# Patient Record
Sex: MALE | Race: BLACK OR AFRICAN AMERICAN | NOT HISPANIC OR LATINO | Employment: FULL TIME | URBAN - METROPOLITAN AREA
[De-identification: names, ages, dates, MRNs, and addresses within clinical notes are randomized per-mention and may not be internally consistent; named-entity substitution may affect disease eponyms.]

---

## 2021-04-15 ENCOUNTER — OFFICE VISIT (OUTPATIENT)
Dept: PRIMARY CARE CLINIC | Facility: CLINIC | Age: 41
End: 2021-04-15
Payer: COMMERCIAL

## 2021-04-15 VITALS
HEART RATE: 89 BPM | WEIGHT: 315 LBS | HEIGHT: 73 IN | RESPIRATION RATE: 16 BRPM | BODY MASS INDEX: 41.75 KG/M2 | OXYGEN SATURATION: 99 % | DIASTOLIC BLOOD PRESSURE: 80 MMHG | SYSTOLIC BLOOD PRESSURE: 128 MMHG

## 2021-04-15 DIAGNOSIS — M79.672 CHRONIC HEEL PAIN, LEFT: ICD-10-CM

## 2021-04-15 DIAGNOSIS — J34.89 NASAL DRYNESS: ICD-10-CM

## 2021-04-15 DIAGNOSIS — G89.29 CHRONIC HEEL PAIN, LEFT: ICD-10-CM

## 2021-04-15 DIAGNOSIS — Z76.89 ENCOUNTER TO ESTABLISH CARE: Primary | ICD-10-CM

## 2021-04-15 DIAGNOSIS — Z11.59 NEED FOR HEPATITIS C SCREENING TEST: ICD-10-CM

## 2021-04-15 DIAGNOSIS — Z11.4 ENCOUNTER FOR SCREENING FOR HIV: ICD-10-CM

## 2021-04-15 DIAGNOSIS — Z13.6 ENCOUNTER FOR SCREENING FOR CARDIOVASCULAR DISORDERS: ICD-10-CM

## 2021-04-15 DIAGNOSIS — R14.2 BELCHING: ICD-10-CM

## 2021-04-15 PROCEDURE — 99999 PR PBB SHADOW E&M-NEW PATIENT-LVL IV: CPT | Mod: PBBFAC,,, | Performed by: STUDENT IN AN ORGANIZED HEALTH CARE EDUCATION/TRAINING PROGRAM

## 2021-04-15 PROCEDURE — 99203 OFFICE O/P NEW LOW 30 MIN: CPT | Mod: S$GLB,,, | Performed by: STUDENT IN AN ORGANIZED HEALTH CARE EDUCATION/TRAINING PROGRAM

## 2021-04-15 PROCEDURE — 99999 PR PBB SHADOW E&M-NEW PATIENT-LVL IV: ICD-10-PCS | Mod: PBBFAC,,, | Performed by: STUDENT IN AN ORGANIZED HEALTH CARE EDUCATION/TRAINING PROGRAM

## 2021-04-15 PROCEDURE — 99203 PR OFFICE/OUTPT VISIT, NEW, LEVL III, 30-44 MIN: ICD-10-PCS | Mod: S$GLB,,, | Performed by: STUDENT IN AN ORGANIZED HEALTH CARE EDUCATION/TRAINING PROGRAM

## 2021-04-15 RX ORDER — PANTOPRAZOLE SODIUM 40 MG/1
40 TABLET, DELAYED RELEASE ORAL DAILY
Qty: 30 TABLET | Refills: 1 | Status: SHIPPED | OUTPATIENT
Start: 2021-04-15 | End: 2022-04-15

## 2021-04-15 RX ORDER — PANTOPRAZOLE SODIUM 20 MG/1
20 TABLET, DELAYED RELEASE ORAL DAILY
Qty: 30 TABLET | Refills: 11 | Status: SHIPPED | OUTPATIENT
Start: 2021-06-14 | End: 2022-10-12

## 2021-04-15 RX ORDER — FLUTICASONE PROPIONATE 50 MCG
2 SPRAY, SUSPENSION (ML) NASAL DAILY
Qty: 15.8 ML | Refills: 5 | Status: SHIPPED | OUTPATIENT
Start: 2021-04-15 | End: 2022-10-12

## 2021-04-16 ENCOUNTER — PATIENT MESSAGE (OUTPATIENT)
Dept: RESEARCH | Facility: HOSPITAL | Age: 41
End: 2021-04-16

## 2021-05-05 ENCOUNTER — CLINICAL SUPPORT (OUTPATIENT)
Dept: PRIMARY CARE CLINIC | Facility: CLINIC | Age: 41
End: 2021-05-05
Payer: COMMERCIAL

## 2021-05-05 DIAGNOSIS — Z23 NEED FOR TETANUS, DIPHTHERIA, AND ACELLULAR PERTUSSIS (TDAP) VACCINE: ICD-10-CM

## 2021-05-05 DIAGNOSIS — Z23 NEED FOR PNEUMOCOCCAL VACCINATION: Primary | ICD-10-CM

## 2021-05-05 PROCEDURE — 90715 TDAP VACCINE 7 YRS/> IM: CPT | Mod: S$GLB,,, | Performed by: STUDENT IN AN ORGANIZED HEALTH CARE EDUCATION/TRAINING PROGRAM

## 2021-05-05 PROCEDURE — 90471 IMMUNIZATION ADMIN: CPT | Mod: S$GLB,,, | Performed by: STUDENT IN AN ORGANIZED HEALTH CARE EDUCATION/TRAINING PROGRAM

## 2021-05-05 PROCEDURE — 90471 PNEUMOCOCCAL POLYSACCHARIDE VACCINE 23-VALENT =>2YO SQ IM: ICD-10-PCS | Mod: S$GLB,,, | Performed by: STUDENT IN AN ORGANIZED HEALTH CARE EDUCATION/TRAINING PROGRAM

## 2021-05-05 PROCEDURE — 90472 IMMUNIZATION ADMIN EACH ADD: CPT | Mod: S$GLB,,, | Performed by: STUDENT IN AN ORGANIZED HEALTH CARE EDUCATION/TRAINING PROGRAM

## 2021-05-05 PROCEDURE — 90732 PNEUMOCOCCAL POLYSACCHARIDE VACCINE 23-VALENT =>2YO SQ IM: ICD-10-PCS | Mod: S$GLB,,, | Performed by: STUDENT IN AN ORGANIZED HEALTH CARE EDUCATION/TRAINING PROGRAM

## 2021-05-05 PROCEDURE — 99999 PR PBB SHADOW E&M-EST. PATIENT-LVL I: ICD-10-PCS | Mod: PBBFAC,,,

## 2021-05-05 PROCEDURE — 99999 PR PBB SHADOW E&M-EST. PATIENT-LVL I: CPT | Mod: PBBFAC,,,

## 2021-05-05 PROCEDURE — 90472 TDAP VACCINE GREATER THAN OR EQUAL TO 7YO IM: ICD-10-PCS | Mod: S$GLB,,, | Performed by: STUDENT IN AN ORGANIZED HEALTH CARE EDUCATION/TRAINING PROGRAM

## 2021-05-05 PROCEDURE — 90715 TDAP VACCINE GREATER THAN OR EQUAL TO 7YO IM: ICD-10-PCS | Mod: S$GLB,,, | Performed by: STUDENT IN AN ORGANIZED HEALTH CARE EDUCATION/TRAINING PROGRAM

## 2021-05-05 PROCEDURE — 90732 PPSV23 VACC 2 YRS+ SUBQ/IM: CPT | Mod: S$GLB,,, | Performed by: STUDENT IN AN ORGANIZED HEALTH CARE EDUCATION/TRAINING PROGRAM

## 2021-07-02 ENCOUNTER — IMMUNIZATION (OUTPATIENT)
Dept: PRIMARY CARE CLINIC | Facility: CLINIC | Age: 41
End: 2021-07-02
Payer: COMMERCIAL

## 2021-07-02 ENCOUNTER — PATIENT OUTREACH (OUTPATIENT)
Dept: ADMINISTRATIVE | Facility: HOSPITAL | Age: 41
End: 2021-07-02

## 2021-07-02 DIAGNOSIS — Z23 NEED FOR VACCINATION: Primary | ICD-10-CM

## 2021-07-02 PROCEDURE — 91300 COVID-19, MRNA, LNP-S, PF, 30 MCG/0.3 ML DOSE VACCINE: CPT | Mod: PBBFAC | Performed by: EMERGENCY MEDICINE

## 2021-07-23 ENCOUNTER — IMMUNIZATION (OUTPATIENT)
Dept: PRIMARY CARE CLINIC | Facility: CLINIC | Age: 41
End: 2021-07-23
Payer: COMMERCIAL

## 2021-07-23 DIAGNOSIS — Z23 NEED FOR VACCINATION: Primary | ICD-10-CM

## 2021-07-23 PROCEDURE — 0002A COVID-19, MRNA, LNP-S, PF, 30 MCG/0.3 ML DOSE VACCINE: CPT | Mod: PBBFAC | Performed by: FAMILY MEDICINE

## 2021-07-23 PROCEDURE — 91300 COVID-19, MRNA, LNP-S, PF, 30 MCG/0.3 ML DOSE VACCINE: CPT | Mod: PBBFAC | Performed by: FAMILY MEDICINE

## 2022-10-12 ENCOUNTER — OFFICE VISIT (OUTPATIENT)
Dept: PRIMARY CARE CLINIC | Facility: CLINIC | Age: 42
End: 2022-10-12
Payer: COMMERCIAL

## 2022-10-12 DIAGNOSIS — H92.02 LEFT EAR PAIN: ICD-10-CM

## 2022-10-12 DIAGNOSIS — J01.00 ACUTE NON-RECURRENT MAXILLARY SINUSITIS: Primary | ICD-10-CM

## 2022-10-12 DIAGNOSIS — R05.9 COUGH, UNSPECIFIED TYPE: ICD-10-CM

## 2022-10-12 PROCEDURE — 99214 OFFICE O/P EST MOD 30 MIN: CPT | Mod: 95,,, | Performed by: STUDENT IN AN ORGANIZED HEALTH CARE EDUCATION/TRAINING PROGRAM

## 2022-10-12 PROCEDURE — 1160F RVW MEDS BY RX/DR IN RCRD: CPT | Mod: CPTII,95,, | Performed by: STUDENT IN AN ORGANIZED HEALTH CARE EDUCATION/TRAINING PROGRAM

## 2022-10-12 PROCEDURE — 99214 PR OFFICE/OUTPT VISIT, EST, LEVL IV, 30-39 MIN: ICD-10-PCS | Mod: 95,,, | Performed by: STUDENT IN AN ORGANIZED HEALTH CARE EDUCATION/TRAINING PROGRAM

## 2022-10-12 PROCEDURE — 1159F PR MEDICATION LIST DOCUMENTED IN MEDICAL RECORD: ICD-10-PCS | Mod: CPTII,95,, | Performed by: STUDENT IN AN ORGANIZED HEALTH CARE EDUCATION/TRAINING PROGRAM

## 2022-10-12 PROCEDURE — 1159F MED LIST DOCD IN RCRD: CPT | Mod: CPTII,95,, | Performed by: STUDENT IN AN ORGANIZED HEALTH CARE EDUCATION/TRAINING PROGRAM

## 2022-10-12 PROCEDURE — 1160F PR REVIEW ALL MEDS BY PRESCRIBER/CLIN PHARMACIST DOCUMENTED: ICD-10-PCS | Mod: CPTII,95,, | Performed by: STUDENT IN AN ORGANIZED HEALTH CARE EDUCATION/TRAINING PROGRAM

## 2022-10-12 RX ORDER — BENZONATATE 200 MG/1
200 CAPSULE ORAL 3 TIMES DAILY PRN
Qty: 30 CAPSULE | Refills: 0 | Status: SHIPPED | OUTPATIENT
Start: 2022-10-12 | End: 2022-10-22

## 2022-10-12 RX ORDER — AMOXICILLIN AND CLAVULANATE POTASSIUM 875; 125 MG/1; MG/1
1 TABLET, FILM COATED ORAL 2 TIMES DAILY
Qty: 20 TABLET | Refills: 0 | Status: SHIPPED | OUTPATIENT
Start: 2022-10-12 | End: 2022-10-22

## 2022-10-12 RX ORDER — CODEINE PHOSPHATE AND GUAIFENESIN 10; 100 MG/5ML; MG/5ML
5 SOLUTION ORAL EVERY 6 HOURS PRN
Qty: 118 ML | Refills: 0 | Status: SHIPPED | OUTPATIENT
Start: 2022-10-12

## 2022-10-12 NOTE — PROGRESS NOTES
The patient location is: [place]  The chief complaint leading to consultation is: URI symptoms    Visit type: audiovisual    Face to Face time with patient: 11 minutes  13 minutes of total time spent on the encounter, which includes face to face time and non-face to face time preparing to see the patient (eg, review of tests), Obtaining and/or reviewing separately obtained history, Documenting clinical information in the electronic or other health record, Independently interpreting results (not separately reported) and communicating results to the patient/family/caregiver, or Care coordination (not separately reported).         Each patient to whom he or she provides medical services by telemedicine is:  (1) informed of the relationship between the physician and patient and the respective role of any other health care provider with respect to management of the patient; and (2) notified that he or she may decline to receive medical services by telemedicine and may withdraw from such care at any time.    Notes:     HPI:  Patient reports he has been having ear pain and cough. Cough causes pain left eye and ear. Symptoms began 10/6/2022. Has gotten better since initial onset of symptoms. Eye and ear has been present past 2 days. No discharge. Does use q-tips. Pain is sharp. Worse with cough. Fees along face into cheek bone. When symptoms began felt body aches and malaise. Has been taking tylenol which is helping.      Sick contacts?: Wife was sick prior to him  Fever?: None  Shortness of breath?: None  Loss of taste smell?: None  Received flu vaccine?: No  Received COVID vaccine?: Pfizer x2, has had COVID in past, Negative for COVID 10/7/2022        Review of Systems   Constitutional:  Negative for chills, diaphoresis, fatigue and fever.   HENT:  Positive for ear pain (left), sinus pressure and sinus pain (left side). Negative for congestion, sneezing and sore throat.    Respiratory:  Positive for cough (mucus,  non-bloody). Negative for shortness of breath.    Cardiovascular:  Negative for chest pain and palpitations.   Gastrointestinal:  Positive for diarrhea (loose). Negative for abdominal pain, nausea and vomiting.   Skin:  Negative for rash and wound.   Neurological:  Positive for headaches (mild).      Physical Exam  Constitutional:       General: He is not in acute distress.  HENT:      Head: Normocephalic and atraumatic.   Eyes:      General:         Right eye: No discharge.         Left eye: No discharge.      Conjunctiva/sclera: Conjunctivae normal.   Pulmonary:      Effort: Pulmonary effort is normal. No respiratory distress.   Skin:     Coloration: Skin is not jaundiced or pale.   Neurological:      General: No focal deficit present.      Mental Status: He is alert and oriented to person, place, and time.   Psychiatric:         Mood and Affect: Mood normal.         Behavior: Behavior normal.         Thought Content: Thought content normal.           Plan:    Cough, unspecified type  Left Ear Pain  Acute non-recurrent maxillary sinusitis  -     amoxicillin-clavulanate 875-125mg (AUGMENTIN) 875-125 mg per tablet; Take 1 tablet by mouth 2 (two) times daily. for 10 days  Dispense: 20 tablet; Refill: 0  -     benzonatate (TESSALON) 200 MG capsule; Take 1 capsule (200 mg total) by mouth 3 (three) times daily as needed for Cough.  Dispense: 30 capsule; Refill: 0  -     guaiFENesin-codeine 100-10 mg/5 ml (TUSSI-ORGANIDIN NR)  mg/5 mL syrup; Take 5 mLs by mouth every 6 (six) hours as needed for Cough. Do not drive or perform dangerous tasks while taking  Dispense: 118 mL; Refill: 0  - Side effects of medication provided today and instructed patient to not drive or perform dangerous tasks while taking  - Present to ED if severely fatigued or respiratory distress develops   - Recommend do not use q-tips    RTC PRN

## 2023-05-08 ENCOUNTER — OFFICE VISIT (OUTPATIENT)
Dept: PRIMARY CARE CLINIC | Facility: CLINIC | Age: 43
End: 2023-05-08
Payer: COMMERCIAL

## 2023-05-08 VITALS
HEART RATE: 81 BPM | OXYGEN SATURATION: 95 % | WEIGHT: 315 LBS | DIASTOLIC BLOOD PRESSURE: 85 MMHG | BODY MASS INDEX: 41.75 KG/M2 | TEMPERATURE: 99 F | SYSTOLIC BLOOD PRESSURE: 134 MMHG | HEIGHT: 73 IN

## 2023-05-08 DIAGNOSIS — M54.2 NECK PAIN: ICD-10-CM

## 2023-05-08 DIAGNOSIS — E66.01 MORBID OBESITY: ICD-10-CM

## 2023-05-08 DIAGNOSIS — M62.838 MUSCLE SPASMS OF NECK: ICD-10-CM

## 2023-05-08 DIAGNOSIS — M25.512 ACUTE PAIN OF LEFT SHOULDER: Primary | ICD-10-CM

## 2023-05-08 PROCEDURE — 99215 PR OFFICE/OUTPT VISIT, EST, LEVL V, 40-54 MIN: ICD-10-PCS | Mod: 25,S$GLB,, | Performed by: NURSE PRACTITIONER

## 2023-05-08 PROCEDURE — 96372 PR INJECTION,THERAP/PROPH/DIAG2ST, IM OR SUBCUT: ICD-10-PCS | Mod: S$GLB,,, | Performed by: NURSE PRACTITIONER

## 2023-05-08 PROCEDURE — 3075F SYST BP GE 130 - 139MM HG: CPT | Mod: CPTII,S$GLB,, | Performed by: NURSE PRACTITIONER

## 2023-05-08 PROCEDURE — 3008F PR BODY MASS INDEX (BMI) DOCUMENTED: ICD-10-PCS | Mod: CPTII,S$GLB,, | Performed by: NURSE PRACTITIONER

## 2023-05-08 PROCEDURE — 96372 THER/PROPH/DIAG INJ SC/IM: CPT | Mod: S$GLB,,, | Performed by: NURSE PRACTITIONER

## 2023-05-08 PROCEDURE — 3008F BODY MASS INDEX DOCD: CPT | Mod: CPTII,S$GLB,, | Performed by: NURSE PRACTITIONER

## 2023-05-08 PROCEDURE — 99215 OFFICE O/P EST HI 40 MIN: CPT | Mod: 25,S$GLB,, | Performed by: NURSE PRACTITIONER

## 2023-05-08 PROCEDURE — 3075F PR MOST RECENT SYSTOLIC BLOOD PRESS GE 130-139MM HG: ICD-10-PCS | Mod: CPTII,S$GLB,, | Performed by: NURSE PRACTITIONER

## 2023-05-08 PROCEDURE — 3079F PR MOST RECENT DIASTOLIC BLOOD PRESSURE 80-89 MM HG: ICD-10-PCS | Mod: CPTII,S$GLB,, | Performed by: NURSE PRACTITIONER

## 2023-05-08 PROCEDURE — 99999 PR PBB SHADOW E&M-EST. PATIENT-LVL IV: CPT | Mod: PBBFAC,,, | Performed by: NURSE PRACTITIONER

## 2023-05-08 PROCEDURE — 3079F DIAST BP 80-89 MM HG: CPT | Mod: CPTII,S$GLB,, | Performed by: NURSE PRACTITIONER

## 2023-05-08 PROCEDURE — 1159F PR MEDICATION LIST DOCUMENTED IN MEDICAL RECORD: ICD-10-PCS | Mod: CPTII,S$GLB,, | Performed by: NURSE PRACTITIONER

## 2023-05-08 PROCEDURE — 1159F MED LIST DOCD IN RCRD: CPT | Mod: CPTII,S$GLB,, | Performed by: NURSE PRACTITIONER

## 2023-05-08 PROCEDURE — 99999 PR PBB SHADOW E&M-EST. PATIENT-LVL IV: ICD-10-PCS | Mod: PBBFAC,,, | Performed by: NURSE PRACTITIONER

## 2023-05-08 RX ORDER — PREDNISONE 20 MG/1
20 TABLET ORAL DAILY
Qty: 5 TABLET | Refills: 0 | Status: SHIPPED | OUTPATIENT
Start: 2023-05-08 | End: 2023-05-13

## 2023-05-08 RX ORDER — NAPROXEN 500 MG/1
500 TABLET ORAL 2 TIMES DAILY PRN
Qty: 20 TABLET | Refills: 0 | Status: SHIPPED | OUTPATIENT
Start: 2023-05-08

## 2023-05-08 RX ORDER — KETOROLAC TROMETHAMINE 30 MG/ML
30 INJECTION, SOLUTION INTRAMUSCULAR; INTRAVENOUS
Status: COMPLETED | OUTPATIENT
Start: 2023-05-08 | End: 2023-05-08

## 2023-05-08 RX ORDER — METHOCARBAMOL 500 MG/1
500 TABLET, FILM COATED ORAL NIGHTLY PRN
Qty: 7 TABLET | Refills: 0 | Status: SHIPPED | OUTPATIENT
Start: 2023-05-08 | End: 2023-05-15

## 2023-05-08 RX ADMIN — KETOROLAC TROMETHAMINE 30 MG: 30 INJECTION, SOLUTION INTRAMUSCULAR; INTRAVENOUS at 03:05

## 2023-05-08 NOTE — PROGRESS NOTES
Subjective:       Patient ID: Tristian Saldana is a 42 y.o. male.    Chief Complaint: left shoulder and neck pain.    Mr. Tristian Saldana is a 42 year old male, new to me, presents to the clinic with complaints of left shoulder and neck pain. PCP is Ruben Lerma. Medical and surgical history in addition to problem list reviewed as listed below.    Shoulder Pain   The pain is present in the neck and left shoulder. This is a new problem. The current episode started in the past 7 days (05/04/2023). 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 The problem occurs constantly. The problem has been gradually worsening. The quality of the pain is described as sharp and aching. The pain is at a severity of 8/10. The pain is severe. Associated symptoms include an inability to bear weight, a limited range of motion, numbness, stiffness and tingling. Pertinent negatives include no fever or headaches. The symptoms are aggravated by activity. He has tried NSAIDS for the symptoms. The treatment provided no relief. His past medical history is significant for Injuries to Extremity. There is no history of diabetes or migraines. left shoulder hit on airplane door   Neck Pain   This is a new problem. The current episode started in the past 7 days. The problem occurs constantly. The problem has been gradually worsening. The pain is associated with lifting a heavy object. Pain location: cervical. The quality of the pain is described as shooting and aching. The pain is at a severity of 8/10. The pain is severe. The symptoms are aggravated by position. The pain is Same all the time. Stiffness is present All day. Associated symptoms include numbness and tingling. Pertinent negatives include no fever or headaches. He has tried NSAIDs and heat for the symptoms. The treatment provided no relief.     History reviewed. No pertinent past medical history.      History reviewed. No pertinent surgical history.     History reviewed. No pertinent family history.    Social History     Tobacco Use   Smoking Status Some Days    Types: Cigars   Smokeless Tobacco Never       Social History     Social History Narrative    Not on file       Review of patient's allergies indicates:   Allergen Reactions    Loperamide Hives        Review of Systems   Constitutional:  Negative for fever.   Musculoskeletal:  Positive for neck pain and stiffness.   Neurological:  Positive for tingling and numbness. Negative for headaches.       Objective:        Vitals:    05/08/23 1518   BP: 134/85   Pulse: 81   Temp: 98.5 °F (36.9 °C)        Physical Exam  Constitutional:       Appearance: Normal appearance.   Pulmonary:      Effort: Pulmonary effort is normal. No respiratory distress.   Musculoskeletal:         General: Tenderness (Left shoulder and neck) and signs of injury present.   Skin:     General: Skin is warm and dry.   Neurological:      Mental Status: He is alert and oriented to person, place, and time.       Assessment:       1. Acute pain of left shoulder    2. Neck pain    3. Muscle spasms of neck        Plan:       Acute pain of left shoulder  Rule out muscle strain, muscle sprain, bursitis, tendonitis.  Tolerates flexion, abduction and external rotation.  -     naproxen (NAPROSYN) 500 MG tablet; Take 1 tablet (500 mg total) by mouth 2 (two) times daily as needed (left shoulder and neck pain).  Dispense: 20 tablet; Refill: 0  -     predniSONE (DELTASONE) 20 MG tablet; Take 1 tablet (20 mg total) by mouth once daily. Start 05/09/2023 for 5 days  Dispense: 5 tablet; Refill: 0  -     methylPREDNISolone sod suc(PF) injection 40 mg  -     ketorolac injection 30 mg    Neck pain  Rule out muscle strain, muscle sprain, bursitis, tendonitis.  Flexion and extension noted.  -     naproxen (NAPROSYN) 500 MG tablet; Take 1 tablet (500 mg total) by mouth 2 (two) times daily as needed (left shoulder  and neck pain).  Dispense: 20 tablet; Refill: 0  -     predniSONE (DELTASONE) 20 MG tablet; Take 1 tablet (20 mg total) by mouth once daily. Start 05/09/2023 for 5 days  Dispense: 5 tablet; Refill: 0  -     methylPREDNISolone sod suc(PF) injection 40 mg  -     ketorolac injection 30 mg    Muscle spasms of neck  -     methocarbamoL (ROBAXIN) 500 MG Tab; Take 1 tablet (500 mg total) by mouth nightly as needed (shoulder and neck pain).  Dispense: 7 tablet; Refill: 0    Morbid obesity  Recommend heart healthy diet, exercise 3 times a week for 30 minute intervals, increase as tolerated.       Suspect muscle strain from lifting and pulling on luggage to place on airplane.  If symptoms persist/worsen consider imaging and referral to sports medicine.  Extensive teaching/discussion/demonstration on proper lifting techniques, handout provided.    Medication List with Changes/Refills   New Medications    METHOCARBAMOL (ROBAXIN) 500 MG TAB    Take 1 tablet (500 mg total) by mouth nightly as needed (shoulder and neck pain).    NAPROXEN (NAPROSYN) 500 MG TABLET    Take 1 tablet (500 mg total) by mouth 2 (two) times daily as needed (left shoulder and neck pain).    PREDNISONE (DELTASONE) 20 MG TABLET    Take 1 tablet (20 mg total) by mouth once daily. Start 05/09/2023 for 5 days   Current Medications    GUAIFENESIN-CODEINE 100-10 MG/5 ML (TUSSI-ORGANIDIN NR)  MG/5 ML SYRUP    Take 5 mLs by mouth every 6 (six) hours as needed for Cough. Do not drive or perform dangerous tasks while taking        Follow up in about 1 week (around 5/15/2023) for Brionna Corley, MSN, APRN, FNP-C.    I spent a total of 40 minutes on the day of the visit.This includes face to face time and non-face to face time preparing to see the patient (eg, review of tests), obtaining and/or reviewing separately obtained history, documenting clinical information in the electronic or other health record, independently interpreting results and communicating  results to the patient/family/caregiver, or care coordinator.     AVA Guerrero, MSN, FNP-C

## 2023-05-08 NOTE — LETTER
May 8, 2023      [facility]  [address]  [facility] [address]  Phone: [phone]  Fax: [phone]       Patient: Tristian Saldana   YOB: 1980  Date of Visit: 05/08/2023    To Whom It May Concern:    Darlene Saldana  was at [facility] on 05/08/2023. The patient may return to work 05/09/2023 with restrictions, no lifting/pulling until return evaluation Monday, May 15, 2023. If you have any questions or concerns, or if I can be of further assistance, please do not hesitate to contact me.    Sincerely,         Brionna Corley NP

## 2023-09-15 ENCOUNTER — PATIENT MESSAGE (OUTPATIENT)
Dept: ADMINISTRATIVE | Facility: HOSPITAL | Age: 43
End: 2023-09-15
Payer: COMMERCIAL

## 2023-09-28 ENCOUNTER — PATIENT OUTREACH (OUTPATIENT)
Dept: ADMINISTRATIVE | Facility: HOSPITAL | Age: 43
End: 2023-09-28
Payer: COMMERCIAL

## 2023-09-28 NOTE — PROGRESS NOTES
Health Maintenance Due   Topic Date Due    Hemoglobin A1c (Diabetic Prevention Screening)  Never done    COVID-19 Vaccine (3 - Pfizer series) 09/17/2021    Lipid Panel  04/15/2022    Pneumococcal Vaccines (Age 0-64) (2 - PCV) 05/05/2022    Influenza Vaccine (1) Never done    Colorectal Cancer Screening  Never done     Immunizations - reviewed and updated   Care Everywhere - triggered   Care Teams - updated   Outreach - Called for patient in regards to overdue colon cancer screening, no answer. Unable to leave message.

## 2024-05-15 ENCOUNTER — PATIENT OUTREACH (OUTPATIENT)
Dept: ADMINISTRATIVE | Facility: HOSPITAL | Age: 44
End: 2024-05-15
Payer: COMMERCIAL

## 2024-05-15 NOTE — PROGRESS NOTES
Health Maintenance Due   Topic Date Due    Hemoglobin A1c (Diabetic Prevention Screening)  Never done    Lipid Panel  04/15/2022    Pneumococcal Vaccines (Age 0-64) (2 of 2 - PCV) 05/05/2022    COVID-19 Vaccine (3 - 2023-24 season) 09/01/2023    Colorectal Cancer Screening  Never done     Immunizations - reviewed and updated   Care Everywhere - triggered   Care Teams -   Outreach - Commercial Gap Report 2024 reviewed. Patient due for annual visit with PCP as his last visit was a virtual on 10/12/2022. Called and spoke with patient, annual scheduled for 6/5/2024. Patient states understanding   Colon Cancer Screening due - Added to appointment notes on 6/5/2024

## 2024-06-05 ENCOUNTER — DOCUMENTATION ONLY (OUTPATIENT)
Dept: SURGERY | Facility: CLINIC | Age: 44
End: 2024-06-05
Payer: COMMERCIAL

## 2024-06-05 ENCOUNTER — OFFICE VISIT (OUTPATIENT)
Dept: PRIMARY CARE CLINIC | Facility: CLINIC | Age: 44
End: 2024-06-05
Payer: COMMERCIAL

## 2024-06-05 VITALS
RESPIRATION RATE: 18 BRPM | HEART RATE: 98 BPM | OXYGEN SATURATION: 96 % | SYSTOLIC BLOOD PRESSURE: 146 MMHG | HEIGHT: 73 IN | BODY MASS INDEX: 41.75 KG/M2 | WEIGHT: 315 LBS | DIASTOLIC BLOOD PRESSURE: 94 MMHG

## 2024-06-05 DIAGNOSIS — Z13.1 DIABETES MELLITUS SCREENING: ICD-10-CM

## 2024-06-05 DIAGNOSIS — Z51.81 MEDICATION MONITORING ENCOUNTER: ICD-10-CM

## 2024-06-05 DIAGNOSIS — Z00.00 ANNUAL PHYSICAL EXAM: Primary | ICD-10-CM

## 2024-06-05 DIAGNOSIS — Z13.6 ENCOUNTER FOR SCREENING FOR CARDIOVASCULAR DISORDERS: ICD-10-CM

## 2024-06-05 DIAGNOSIS — Z12.11 COLON CANCER SCREENING: ICD-10-CM

## 2024-06-05 PROCEDURE — 1160F RVW MEDS BY RX/DR IN RCRD: CPT | Mod: CPTII,S$GLB,, | Performed by: STUDENT IN AN ORGANIZED HEALTH CARE EDUCATION/TRAINING PROGRAM

## 2024-06-05 PROCEDURE — 3008F BODY MASS INDEX DOCD: CPT | Mod: CPTII,S$GLB,, | Performed by: STUDENT IN AN ORGANIZED HEALTH CARE EDUCATION/TRAINING PROGRAM

## 2024-06-05 PROCEDURE — 3077F SYST BP >= 140 MM HG: CPT | Mod: CPTII,S$GLB,, | Performed by: STUDENT IN AN ORGANIZED HEALTH CARE EDUCATION/TRAINING PROGRAM

## 2024-06-05 PROCEDURE — 3080F DIAST BP >= 90 MM HG: CPT | Mod: CPTII,S$GLB,, | Performed by: STUDENT IN AN ORGANIZED HEALTH CARE EDUCATION/TRAINING PROGRAM

## 2024-06-05 PROCEDURE — 99396 PREV VISIT EST AGE 40-64: CPT | Mod: S$GLB,,, | Performed by: STUDENT IN AN ORGANIZED HEALTH CARE EDUCATION/TRAINING PROGRAM

## 2024-06-05 PROCEDURE — 1159F MED LIST DOCD IN RCRD: CPT | Mod: CPTII,S$GLB,, | Performed by: STUDENT IN AN ORGANIZED HEALTH CARE EDUCATION/TRAINING PROGRAM

## 2024-06-05 PROCEDURE — 99999 PR PBB SHADOW E&M-EST. PATIENT-LVL III: CPT | Mod: PBBFAC,,, | Performed by: STUDENT IN AN ORGANIZED HEALTH CARE EDUCATION/TRAINING PROGRAM

## 2024-06-05 RX ORDER — SODIUM, POTASSIUM,MAG SULFATES 17.5-3.13G
1 SOLUTION, RECONSTITUTED, ORAL ORAL DAILY
Qty: 1 KIT | Refills: 0 | Status: SHIPPED | OUTPATIENT
Start: 2024-06-05 | End: 2024-06-07

## 2024-06-05 NOTE — TELEPHONE ENCOUNTER
The patient has been advised the Colonoscopy Prep Kit will be ordered from the patient's verified preferred pharmacy on file. The medication can  be picked up by the patient, or the patient's designated representative.The patient was further explained the Colonoscopy Prep instructions will be mailed to the patient verified mailing address on file, or put onto the [employer] portal, whichever method of delivery the patient prefers.Additionally this patient was informed,not to follow the instructions that comes with the bowel prep medication. However, the patient was instructed to please follow the Colonoscopy Bowel Prep instructions that's being provided by the . The patient was asked to please to follow the Colonoscopy Prep instructions being provided as precisely,and  meticulously as possible.The patient was advised you  will receive a follow up phone call to summarize the Colonoscopy Prep instructions prior to the scheduled Colonoscopy procedure date. At this time the patient will be given an opportunity to ask any questions regarding the Colonoscopy procedure, and it's associated Bowel Prep instructions.

## 2024-06-05 NOTE — PROGRESS NOTES
This patient arrived at the Scheduling as a walk in patient, post visit with the PCP   requesting to be scheduled to have a Colonoscopy. A review of the patient's chart,denotes a case request to schedule the patient to have a Colonoscopy for colon cancer screening. The patient verbally consented to be scheduled to have the Colonoscopy, and was scheduled for the Colonoscopy on the date of the patient's request 07/25/2024.The patient was given a detailed explanation of the Colonoscopy prep instructions, along with a copy of the associated bowel prep instructions. The patient acknowledged understanding of the prep instructions, and was give an opportunity to ask any questions about the Colonoscopy procedure, and the associated prep instructions.        Please do not follow the bowel prep instructions contained in the medication package,. However, please follow the prep instructions illustrated below meticulously as possible.          Bowel Prep/SUPREP instructions                                               [facility]    [address] [place] [address]      You are scheduled for a Colonoscopy with __Dalton Lees MD _____________________ on __07/25/2024__________________   At [facility] in [place].      Check in at the hospital on 1st floor Registration area next to Emergency room.    Please call [phone] to reschedule or if you have any questions.    An adult friend/family member must come with you to drive you home.  You cannot drive, take a taxi, Uber/Lyft or bus to leave the Hospital alone. If you do not have someone with you to drive you home, your test will be cancelled.       Please follow the directions of your doctor if you take any pills that thin your blood.  If you take these meds: Aggrenox, Brilinta, Effient, Eliquis, Lovenox, Plavix, Pletal Pradaxa ticilid, Xarelto, or Coumadin, let the doctor's office know.    Don't: On the morning of the test do  not take insulin or pills for diabetes.   Do: On the morning of the test, do take any pills for blood pressure, heart, anti-rejection and or seizures with a small sip of water. Bring any inhalers with you day of procedure.    To have a good prep, you must follow these instructions- please do not use the directions from the pharmacy!      The doctor will send a prescription for the SUPREP   The day Before the test:   You can only drink CLEAR LIQUIDS the whole day before your test. You can't eat any food for the whole day.    You CAN have:   Water,Coffee or decaf coffee (no milk or cream)    Tea   Soft drinks- regular and sugar free   Jell-O (green or yellow)   Apple Juice, grape juice, white cranberry juice   Gatorade, Power Aid, Crystal Light, Ranjith Aid   Lemonade and Limeade   Bouillon, clear soup   Snowball, popsicles   YOU CAN'T DRINK ANYTHING RED BLUE PURPLE ORANGE   YOU CAN'T DRINK ALCOHOL   ONLY DRINK WHAT IS ON THIS List      At (5pm) the night before your test:   Pour the 1st bottle of SUPREP into the cup provided in the box.  Add water to the line on the cup and mix well. Drink the whole cup and then drink 2 more full cups of water over the 1 hour.    This can be easier to drink if it is cold.  You can mix it 20 minutes ahead of time and place in the refrigerator before you drink it.  You must drink it within 30-45 minutes of mixing it. Do NOT pour the drink over ice. You can drink it with a straw.    The Day of the test- We will call you 1 day before your test to tell you what time to get there.    (5 hours before you come to the hospital) (this may be the middle of the night)   Pour the 2nd bottle of SUPREP into to the cup provided in the box. Add water to the line on the cup and mix well. Drink the whole cup and then drink 2 more full cups of water over 1 hour.    It might be easier to drink if it is cold. You can mix it 20 minutes ahead of time and place in the refrigerator before you drink it. You must  drink it within 30-45 minutes of mixing it. Do NOT pour the drink over ice. You can drink it with a straw.   YOU CAN'T EAT OR DRINK ANYTHING ELSE ONCE YOU FINISH THE PREP.   Leave all valuables and jewelry at home. You will be at the hospital for 2-4 hours.    Call the Endoscopy Scheduling Department at [phone] with any questions about your procedure.    Please  your medication from your local pharmacy. If you are unable to  the SUPREP Kit please contact our office.   Thank you   Endo Scheduling Dept   [facility]

## 2024-06-05 NOTE — PROGRESS NOTES
"Subjective:       Patient ID: Tristian Saldana is a 43 y.o. male.    Chief Complaint: Annual Exam    HPI:  43 y.o. male presents to [facility] for annual exam    Acute concerns?: None today, here for annual    Fasting?: Yes    Diet?: No specific, no drinks with sugar  Exercise?: Goes to gym 2-3 times per week with cardio for about 1 hour, works for [employer] and moves heavy bags frequently    Patient does snore during sleep  Wife reports some episodes of stopped breathing        6/5/2024    11:18 AM   EPWORTH SLEEPINESS SCALE   Sitting and reading 0   Watching TV 2   Sitting, inactive in a public place (e.g. a theatre or a meeting) 0   As a passenger in a car for an hour without a break 2   Lying down to rest in the afternoon when circumstances permit 2   Sitting and talking to someone 0   Sitting quietly after a lunch without alcohol 0   In a car, while stopped for a few minutes in traffic 0   Total score 6         Review of Systems   Constitutional:  Negative for chills, diaphoresis, fatigue and fever.   HENT:  Negative for congestion, sinus pressure, sneezing and sore throat.    Respiratory:  Negative for cough and shortness of breath.    Cardiovascular:  Negative for chest pain and palpitations.   Gastrointestinal:  Negative for abdominal pain, diarrhea, nausea and vomiting.   Musculoskeletal:  Positive for myalgias (Feels work related. Keeping up with hydration). Negative for arthralgias and joint swelling.   Skin:  Negative for rash and wound.   Neurological:  Negative for dizziness, light-headedness and headaches.       Objective:      Vitals:    06/05/24 1056   BP: (!) 146/94   BP Location: Left arm   Patient Position: Sitting   BP Method: Medium (Manual)   Pulse: 98   Resp: 18   SpO2: 96%   Weight: (!) 181.8 kg (400 lb 11 oz)   Height: 6' 1" (1.854 m)     Physical Exam  Vitals reviewed.   Constitutional:       General: He is not in acute distress.     Appearance: Normal appearance. He is not " "ill-appearing.   HENT:      Head: Normocephalic and atraumatic.   Eyes:      General:         Right eye: No discharge.         Left eye: No discharge.      Conjunctiva/sclera: Conjunctivae normal.   Cardiovascular:      Rate and Rhythm: Normal rate and regular rhythm.      Pulses: Normal pulses.      Heart sounds: No murmur heard.  Pulmonary:      Effort: Pulmonary effort is normal.      Breath sounds: Normal breath sounds.   Musculoskeletal:         General: No deformity.      Cervical back: Neck supple. No rigidity.   Lymphadenopathy:      Cervical: No cervical adenopathy.   Skin:     General: Skin is warm and dry.      Coloration: Skin is not jaundiced.   Neurological:      General: No focal deficit present.      Mental Status: He is alert and oriented to person, place, and time.   Psychiatric:         Mood and Affect: Mood normal.         Behavior: Behavior normal.             No results found for: "NA", "K", "CL", "CO2", "BUN", "CREATININE", "GLUCOSE", "ANIONGAP"  No results found for: "HGBA1C"  No results found for: "BNP", "BNPTRIAGEBLO"    No results found for: "WBC", "HGB", "HCT", "PLT", "GRAN"  Lab Results   Component Value Date    CHOL 202 (H) 04/15/2021    HDL 75 04/15/2021    LDLCALC 118.2 04/15/2021    TRIG 44 04/15/2021        No current outpatient medications on file.        Assessment:       1. Annual physical exam    2. Medication monitoring encounter    3. Diabetes mellitus screening    4. Encounter for screening for cardiovascular disorders    5. Colon cancer screening           Plan:       Annual physical exam  Medication monitoring encounter  -     CBC Auto Differential; Future; Expected date: 06/05/2024  -     Comprehensive Metabolic Panel; Future; Expected date: 06/05/2024  - Health maintenance items reviewed today's visit  - Will monitor home BP and log and notify clinic if consistently > 140 mmHg systolic or 90 mmHg diastolic    Diabetes mellitus screening  -     Hemoglobin A1C; Future; " Expected date: 06/05/2024    Encounter for screening for cardiovascular disorders  -     Lipid Panel; Future; Expected date: 06/05/2024    Colon cancer screening  -     Case Request Endoscopy: COLONOSCOPY    RTC in 1 year

## 2024-06-19 ENCOUNTER — CLINICAL SUPPORT (OUTPATIENT)
Dept: PRIMARY CARE CLINIC | Facility: CLINIC | Age: 44
End: 2024-06-19
Payer: COMMERCIAL

## 2024-06-19 VITALS — HEART RATE: 84 BPM | DIASTOLIC BLOOD PRESSURE: 100 MMHG | SYSTOLIC BLOOD PRESSURE: 151 MMHG

## 2024-06-19 DIAGNOSIS — R03.0 ELEVATED BLOOD PRESSURE READING: Primary | ICD-10-CM

## 2024-06-19 PROCEDURE — 99999 PR PBB SHADOW E&M-EST. PATIENT-LVL II: CPT | Mod: PBBFAC,,,

## 2024-06-19 NOTE — PROGRESS NOTES
2 week follow up BP check for Dr. Ruben Lerma, BP 1st check 151/100 2nd check 144/96. Patient denies N/V, headaches, dizziness, blurred vision. Patient is trying conservative measures and was advised to continue exercise and low sodium diet. Patient will follow up with Dr. Lerma in 3 months. Informed patient to call the office with any questions or concerns. Patient verbalized understanding.

## 2024-09-06 ENCOUNTER — HOSPITAL ENCOUNTER (EMERGENCY)
Facility: HOSPITAL | Age: 44
Discharge: HOME | End: 2024-09-06
Attending: EMERGENCY MEDICINE
Payer: COMMERCIAL

## 2024-09-06 VITALS
TEMPERATURE: 98 F | OXYGEN SATURATION: 96 % | BODY MASS INDEX: 41.75 KG/M2 | WEIGHT: 315 LBS | DIASTOLIC BLOOD PRESSURE: 80 MMHG | SYSTOLIC BLOOD PRESSURE: 128 MMHG | HEART RATE: 89 BPM | HEIGHT: 73 IN | RESPIRATION RATE: 18 BRPM

## 2024-09-06 DIAGNOSIS — S86.112A GASTROCNEMIUS MUSCLE RUPTURE, LEFT, INITIAL ENCOUNTER: Primary | ICD-10-CM

## 2024-09-06 PROCEDURE — 99284 EMERGENCY DEPT VISIT MOD MDM: CPT | Mod: 25

## 2024-09-06 PROCEDURE — 96372 THER/PROPH/DIAG INJ SC/IM: CPT | Performed by: EMERGENCY MEDICINE

## 2024-09-06 PROCEDURE — 63600175 PHARM REV CODE 636 W HCPCS: Performed by: EMERGENCY MEDICINE

## 2024-09-06 RX ORDER — KETOROLAC TROMETHAMINE 30 MG/ML
30 INJECTION, SOLUTION INTRAMUSCULAR; INTRAVENOUS
Status: COMPLETED | OUTPATIENT
Start: 2024-09-06 | End: 2024-09-06

## 2024-09-06 RX ORDER — IBUPROFEN 600 MG/1
600 TABLET ORAL EVERY 6 HOURS PRN
Qty: 30 TABLET | Refills: 0 | Status: SHIPPED | OUTPATIENT
Start: 2024-09-06

## 2024-09-06 RX ADMIN — KETOROLAC TROMETHAMINE 30 MG: 30 INJECTION, SOLUTION INTRAMUSCULAR; INTRAVENOUS at 07:09

## 2024-09-06 NOTE — ED PROVIDER NOTES
Encounter Date: 9/6/2024       History     Chief Complaint   Patient presents with    Leg Pain     States that he was loading baggage onto a plane and felt sudden pop in left calf. Now with painful weightbearing and swelling to calf. Presents awake, alert.     Patient is a 44-year-old male who was loading baggage onto an airplane and when he stepped onto the belt  he felt a pop and severe pain in his left calf.  He has severe pain with ambulation.  No numbness.      Review of patient's allergies indicates:   Allergen Reactions    Loperamide Hives     History reviewed. No pertinent past medical history.  Past Surgical History:   Procedure Laterality Date    COLONOSCOPY N/A 7/25/2024    Procedure: COLONOSCOPY;  Surgeon: Dalton Lees MD;  Location: [facility];  Service: Endoscopy;  Laterality: N/A;     Family History   Problem Relation Name Age of Onset    Diabetes Mother Katharina     Cancer Father Bradly      Social History     Tobacco Use    Smoking status: Never     Passive exposure: Yes    Smokeless tobacco: Never   Substance Use Topics    Alcohol use: Yes     Comment: socially    Drug use: Never     Review of Systems   Musculoskeletal:         Left calf pain.   Neurological:  Negative for numbness.   All other systems reviewed and are negative.      Physical Exam     Initial Vitals [09/06/24 0637]   BP Pulse Resp Temp SpO2   137/85 83 18 98.3 °F (36.8 °C) 100 %      MAP       --         Physical Exam    Nursing note and vitals reviewed.  Constitutional: No distress.   Cardiovascular:  Normal rate and regular rhythm.           Pulmonary/Chest: No respiratory distress.   Musculoskeletal:      Comments: There is mild swelling diffusely of the left calf with tenderness of the midportion.  No palpable deformity.  Left DP and PT pulses are 2+.  No sensory deficits.  Left Achilles tendon seemed to be intact.     Neurological: He is alert and oriented to person, place, and time.   Skin: Skin is warm and dry.    Psychiatric: Thought content normal.         ED Course   Procedures  Labs Reviewed - No data to display       Imaging Results    None          Medications   ketorolac injection 30 mg (30 mg Intramuscular Given 9/6/24 0728)     Medical Decision Making  44-year-old male who felt a pop in his left calf and began to have severe pain.  My clinical diagnosis is a calf muscle tear.  Plain film x-rays and CTs are of no benefit in this case.  I am unable to do a MRI out of the ED for this.  I will refer the patient to orthopedics for further evaluation and treatment.    Risk  Prescription drug management.                                      Clinical Impression:  Final diagnoses:  [S86.112A] Gastrocnemius muscle rupture, left, initial encounter (Primary)          ED Disposition Condition    Discharge Stable          ED Prescriptions       Medication Sig Dispense Start Date End Date Auth. Provider    ibuprofen (ADVIL,MOTRIN) 600 MG tablet Take 1 tablet (600 mg total) by mouth every 6 (six) hours as needed for Pain. 30 tablet 9/6/2024 -- Braydon Landry MD          Follow-up Information       Follow up With Specialties Details Why Contact Info    Orthopedic doctor  Schedule an appointment as soon as possible for a visit       [facility] Emergency Dept Emergency Medicine  As needed [address] [place] [id number] [phone]             Braydon Landry MD  09/06/24 5949

## 2024-09-06 NOTE — ED TRIAGE NOTES
Pt arrives ED 17 via [facility] c/o posterior left lower leg pain. Pt reports he was stepping up about 1.5 ft and when he fully extended his leg he felt a pop. Popliteal and posterior tib pulses palpable and strong. No discoloration noted. Endorses tenderness to left calf on palpitation. No other complaints. GCS 15, VSS.

## 2024-09-19 ENCOUNTER — PATIENT MESSAGE (OUTPATIENT)
Dept: PRIMARY CARE CLINIC | Facility: CLINIC | Age: 44
End: 2024-09-19
Payer: COMMERCIAL

## 2025-09-02 ENCOUNTER — OFFICE VISIT (OUTPATIENT)
Dept: PRIMARY CARE CLINIC | Facility: CLINIC | Age: 45
End: 2025-09-02
Payer: COMMERCIAL

## 2025-09-02 VITALS
RESPIRATION RATE: 18 BRPM | DIASTOLIC BLOOD PRESSURE: 90 MMHG | HEIGHT: 73 IN | OXYGEN SATURATION: 96 % | BODY MASS INDEX: 41.75 KG/M2 | WEIGHT: 315 LBS | SYSTOLIC BLOOD PRESSURE: 156 MMHG | HEART RATE: 91 BPM | TEMPERATURE: 98 F

## 2025-09-02 DIAGNOSIS — R03.0 ELEVATED BLOOD PRESSURE READING: ICD-10-CM

## 2025-09-02 DIAGNOSIS — R73.03 PRE-DIABETES: ICD-10-CM

## 2025-09-02 DIAGNOSIS — E66.01 MORBID OBESITY: ICD-10-CM

## 2025-09-02 DIAGNOSIS — Z11.3 SCREEN FOR STD (SEXUALLY TRANSMITTED DISEASE): ICD-10-CM

## 2025-09-02 DIAGNOSIS — Z20.2 EXPOSURE TO TRICHOMONAS: Primary | ICD-10-CM

## 2025-09-02 LAB
OHS QRS DURATION: 100 MS
OHS QTC CALCULATION: 442 MS

## 2025-09-02 PROCEDURE — 87661 TRICHOMONAS VAGINALIS AMPLIF: CPT

## 2025-09-02 PROCEDURE — 93010 ELECTROCARDIOGRAM REPORT: CPT | Mod: S$GLB,,, | Performed by: INTERNAL MEDICINE

## 2025-09-02 PROCEDURE — 99999 PR PBB SHADOW E&M-EST. PATIENT-LVL IV: CPT | Mod: PBBFAC,,,

## 2025-09-02 RX ORDER — OLMESARTAN MEDOXOMIL 20 MG/1
20 TABLET ORAL DAILY
Qty: 90 TABLET | Refills: 1 | Status: SHIPPED | OUTPATIENT
Start: 2025-09-02 | End: 2026-09-02

## 2025-09-02 RX ORDER — METRONIDAZOLE 500 MG/1
TABLET ORAL
Qty: 4 TABLET | Refills: 0 | Status: SHIPPED | OUTPATIENT
Start: 2025-09-02

## 2025-09-03 ENCOUNTER — RESULTS FOLLOW-UP (OUTPATIENT)
Dept: PRIMARY CARE CLINIC | Facility: CLINIC | Age: 45
End: 2025-09-03
Payer: COMMERCIAL

## 2025-09-03 DIAGNOSIS — R94.31 ABNORMAL EKG: ICD-10-CM

## 2025-09-03 DIAGNOSIS — I10 PRIMARY HYPERTENSION: Primary | ICD-10-CM

## 2025-09-05 LAB
SPECIMEN SOURCE: ABNORMAL
T VAGINALIS RRNA SPEC QL NAA+PROBE: POSITIVE

## 2025-09-06 LAB
C TRACH DNA SPEC QL NAA+PROBE: NOT DETECTED
CTGC SOURCE (OHS) ORD-325: NORMAL
N GONORRHOEA DNA UR QL NAA+PROBE: NOT DETECTED